# Patient Record
Sex: FEMALE | Race: OTHER | NOT HISPANIC OR LATINO
[De-identification: names, ages, dates, MRNs, and addresses within clinical notes are randomized per-mention and may not be internally consistent; named-entity substitution may affect disease eponyms.]

---

## 2019-08-05 PROBLEM — Z00.00 ENCOUNTER FOR PREVENTIVE HEALTH EXAMINATION: Status: ACTIVE | Noted: 2019-08-05

## 2019-08-23 ENCOUNTER — APPOINTMENT (OUTPATIENT)
Dept: NEUROLOGY | Facility: CLINIC | Age: 29
End: 2019-08-23
Payer: MEDICAID

## 2019-08-23 VITALS
HEART RATE: 76 BPM | SYSTOLIC BLOOD PRESSURE: 98 MMHG | WEIGHT: 137 LBS | HEIGHT: 64 IN | DIASTOLIC BLOOD PRESSURE: 60 MMHG | OXYGEN SATURATION: 99 % | BODY MASS INDEX: 23.39 KG/M2

## 2019-08-23 VITALS — DIASTOLIC BLOOD PRESSURE: 48 MMHG | SYSTOLIC BLOOD PRESSURE: 88 MMHG

## 2019-08-23 DIAGNOSIS — R51 HEADACHE: ICD-10-CM

## 2019-08-23 DIAGNOSIS — Z78.9 OTHER SPECIFIED HEALTH STATUS: ICD-10-CM

## 2019-08-23 DIAGNOSIS — R41.3 OTHER AMNESIA: ICD-10-CM

## 2019-08-23 PROCEDURE — 99203 OFFICE O/P NEW LOW 30 MIN: CPT

## 2019-08-29 ENCOUNTER — APPOINTMENT (OUTPATIENT)
Dept: NEUROLOGY | Facility: CLINIC | Age: 29
End: 2019-08-29
Payer: MEDICAID

## 2019-08-29 PROCEDURE — 95819 EEG AWAKE AND ASLEEP: CPT

## 2019-09-13 ENCOUNTER — APPOINTMENT (OUTPATIENT)
Dept: MRI IMAGING | Facility: HOSPITAL | Age: 29
End: 2019-09-13
Payer: MEDICAID

## 2019-09-13 ENCOUNTER — OUTPATIENT (OUTPATIENT)
Dept: OUTPATIENT SERVICES | Facility: HOSPITAL | Age: 29
LOS: 1 days | End: 2019-09-13
Payer: MEDICAID

## 2019-09-13 PROCEDURE — 70551 MRI BRAIN STEM W/O DYE: CPT | Mod: 26

## 2019-09-13 PROCEDURE — 70551 MRI BRAIN STEM W/O DYE: CPT

## 2019-10-16 ENCOUNTER — APPOINTMENT (OUTPATIENT)
Dept: NEUROLOGY | Facility: CLINIC | Age: 29
End: 2019-10-16
Payer: MEDICAID

## 2019-10-16 VITALS
HEIGHT: 64 IN | OXYGEN SATURATION: 98 % | HEART RATE: 75 BPM | WEIGHT: 137 LBS | DIASTOLIC BLOOD PRESSURE: 70 MMHG | BODY MASS INDEX: 23.39 KG/M2 | SYSTOLIC BLOOD PRESSURE: 102 MMHG

## 2019-10-16 DIAGNOSIS — R27.0 ATAXIA, UNSPECIFIED: ICD-10-CM

## 2019-10-16 DIAGNOSIS — M54.16 RADICULOPATHY, LUMBAR REGION: ICD-10-CM

## 2019-10-16 PROCEDURE — 99212 OFFICE O/P EST SF 10 MIN: CPT

## 2019-10-16 NOTE — PHYSICAL EXAM
[General Appearance - Alert] : alert [General Appearance - In No Acute Distress] : in no acute distress [Oriented To Time, Place, And Person] : oriented to person, place, and time [Impaired Insight] : insight and judgment were intact [Affect] : the affect was normal [Cranial Nerves Optic (II)] : visual acuity intact bilaterally,  visual fields full to confrontation, pupils equal round and reactive to light [Cranial Nerves Oculomotor (III)] : extraocular motion intact [Cranial Nerves Trigeminal (V)] : facial sensation intact symmetrically [Cranial Nerves Glossopharyngeal (IX)] : tongue and palate midline [Cranial Nerves Accessory (XI - Cranial And Spinal)] : head turning and shoulder shrug symmetric [Cranial Nerves Hypoglossal (XII)] : there was no tongue deviation with protrusion [Motor Strength Upper Extremities Left] : there was weakness of the left upper extremity [Dysesthesia] : dysesthesia was present [1+] : Ankle jerk left 1+ [FreeTextEntry6] : Left sciatic distribution  [FreeTextEntry7] : Left sciatic distribution  [Neck Appearance] : the appearance of the neck was normal [Neck Cervical Mass (___cm)] : no neck mass was observed [Jugular Venous Distention Increased] : there was no jugular-venous distention [Thyroid Diffuse Enlargement] : the thyroid was not enlarged [Thyroid Nodule] : there were no palpable thyroid nodules [Full Pulse] : the pedal pulses are present [Edema] : there was no peripheral edema [No Spinal Tenderness] : no spinal tenderness [FreeTextEntry1] : Abnormal gait - throws left leg with walking

## 2019-10-16 NOTE — CONSULT LETTER
[Dear  ___] : Dear  [unfilled], [Consult Letter:] : I had the pleasure of evaluating your patient, [unfilled]. [Please see my note below.] : Please see my note below. [Sincerely,] : Sincerely, [FreeTextEntry3] : Alfred Jackson MD\par  Professor of Neurology\par  Director of Neurology Outreach Programs \par  [facility]\par

## 2019-10-17 ENCOUNTER — APPOINTMENT (OUTPATIENT)
Dept: HEART AND VASCULAR | Facility: CLINIC | Age: 29
End: 2019-10-17
Payer: MEDICAID

## 2019-10-17 VITALS
WEIGHT: 137.99 LBS | TEMPERATURE: 98.4 F | HEART RATE: 85 BPM | OXYGEN SATURATION: 98 % | HEIGHT: 64 IN | SYSTOLIC BLOOD PRESSURE: 98 MMHG | BODY MASS INDEX: 23.56 KG/M2 | DIASTOLIC BLOOD PRESSURE: 60 MMHG

## 2019-10-17 DIAGNOSIS — R00.2 PALPITATIONS: ICD-10-CM

## 2019-10-17 PROCEDURE — 93000 ELECTROCARDIOGRAM COMPLETE: CPT

## 2019-10-17 PROCEDURE — 93306 TTE W/DOPPLER COMPLETE: CPT

## 2019-10-17 PROCEDURE — 99202 OFFICE O/P NEW SF 15 MIN: CPT

## 2019-10-18 NOTE — DISCUSSION/SUMMARY
[FreeTextEntry1] : The number of diagnostic and/or management options include:\par CAD, HTN, HPL, CV Prevention\par \par palp - \par Evaluated adrenergic innervation of the circulation and of the heart in autonomic failure. The following tests were performed: [id number] blood pressure and R-R interval response to Valsalva maneuver, sustained hand , and blood pressure and heart rate responses to tilt-up or active standing. - normal\par \par 14 d event mointopr to eval for arryuthmia\par 2d echo to eval for structural heart\par \par \par Labs, radiology: ekg\par \par Aspirin therapy: no\par \par LDL: n/a\par \par High Complexity Medical Decision Making\par

## 2019-10-18 NOTE — PHYSICAL EXAM
[General Appearance - Well Developed] : well developed [Normal Appearance] : normal appearance [General Appearance - Well Nourished] : well nourished [Well Groomed] : well groomed [No Deformities] : no deformities [General Appearance - In No Acute Distress] : no acute distress [Normal Conjunctiva] : the conjunctiva exhibited no abnormalities [Eyelids - No Xanthelasma] : the eyelids demonstrated no xanthelasmas [Normal Oral Mucosa] : normal oral mucosa [No Oral Pallor] : no oral pallor [No Oral Cyanosis] : no oral cyanosis [Normal Jugular Venous A Waves Present] : normal jugular venous A waves present [No Jugular Venous [place]field A Waves] : no jugular venous whitfield A waves [Normal Jugular Venous V Waves Present] : normal jugular venous V waves present [Heart Rate And Rhythm] : heart rate and rhythm were normal [Murmurs] : no murmurs present [Heart Sounds] : normal S1 and S2 [Auscultation Breath Sounds / Voice Sounds] : lungs were clear to auscultation bilaterally [Respiration, Rhythm And Depth] : normal respiratory rhythm and effort [Exaggerated Use Of Accessory Muscles For Inspiration] : no accessory muscle use [Abdomen Soft] : soft [Abdomen Mass (___ Cm)] : no abdominal mass palpated [Abdomen Tenderness] : non-tender [Abnormal Walk] : normal gait [Gait - Sufficient For Exercise Testing] : the gait was sufficient for exercise testing [Nail Clubbing] : no clubbing of the fingernails [Cyanosis, Localized] : no localized cyanosis [Petechial Hemorrhages (___cm)] : no petechial hemorrhages [] : no rash [Skin Color & Pigmentation] : normal skin color and pigmentation [No Venous Stasis] : no venous stasis [Skin Lesions] : no skin lesions [No Xanthoma] : no  xanthoma was observed [No Skin Ulcers] : no skin ulcer [Affect] : the affect was normal [Oriented To Time, Place, And Person] : oriented to person, place, and time [Mood] : the mood was normal [No Anxiety] : not feeling anxious

## 2019-10-18 NOTE — HISTORY OF PRESENT ILLNESS
[FreeTextEntry1] : \par 29 F no cardiac risk factros sent by neurologist for r/o embolic etiology of lesions seen on mri brain.  s/o palpitaitons no vinay syncope. no cp sob cabezas \par \par ekg nsr no st changes normal segments and intervals

## 2019-10-24 ENCOUNTER — APPOINTMENT (OUTPATIENT)
Dept: NEUROLOGY | Facility: CLINIC | Age: 29
End: 2019-10-24
Payer: MEDICAID

## 2019-10-25 PROCEDURE — 95953: CPT

## 2019-10-26 ENCOUNTER — APPOINTMENT (OUTPATIENT)
Dept: NEUROLOGY | Facility: CLINIC | Age: 29
End: 2019-10-26

## 2019-10-26 PROCEDURE — 95953: CPT

## 2020-09-30 ENCOUNTER — APPOINTMENT (OUTPATIENT)
Dept: NEUROLOGY | Facility: CLINIC | Age: 30
End: 2020-09-30
Payer: MEDICAID

## 2020-09-30 VITALS
HEIGHT: 64 IN | HEART RATE: 78 BPM | DIASTOLIC BLOOD PRESSURE: 56 MMHG | OXYGEN SATURATION: 98 % | SYSTOLIC BLOOD PRESSURE: 92 MMHG | TEMPERATURE: 98.6 F | WEIGHT: 160 LBS | BODY MASS INDEX: 27.31 KG/M2

## 2020-09-30 DIAGNOSIS — Z34.93 ENCOUNTER FOR SUPERVISION OF NORMAL PREGNANCY, UNSPECIFIED, THIRD TRIMESTER: ICD-10-CM

## 2020-09-30 DIAGNOSIS — R90.89 OTHER ABNORMAL FINDINGS ON DIAGNOSTIC IMAGING OF CENTRAL NERVOUS SYSTEM: ICD-10-CM

## 2020-09-30 PROCEDURE — 99215 OFFICE O/P EST HI 40 MIN: CPT

## 2020-09-30 NOTE — DISCUSSION/SUMMARY
[FreeTextEntry1] : 30 year old woman pregnant women with resolved headaches but still complaints of memory loss.\par Here to review MR brain to ensure safe delivery. MRI shows minor signal changes which are not pathological in nature. \par Referred by CHAY Palacios.\par

## 2020-09-30 NOTE — PHYSICAL EXAM
[FreeTextEntry1] : General:\par Constitutional:  Sitting comfortably in NAD.\par Psychiatric: well-groomed, appropriate affect, insight/judgment intact\par Ears, Nose, Throat: no abnormalities, mucus membranes moist\par Neck: supple\par Cardiovascular: regular rate and rhythm, normal S1/S2, no murmurs \par Chest: Clear to bases. 	Abdomen: soft, non-tender\par Extremities: no edema, clubbing or cyanosis\par Skin:  no rash or neurocutaneous signs \par \par Cognitive:\par Orientation, language, memory and knowledge screens intact.\par Cranial Nerves:\par II: Full to confrontation; disc margins sharp. III/IV/VI: PERRL EOMF No nystagmus\par [id number]: Symmetric, VII: Face appears symmetric VIII: Normal to screening, IX/X: Palate Elevates Symmetrical  XI: Trapezius Symmetric  XII: Tongue midline\par Motor:\par Power: 5/5 throughout, tone: normal x 4 limbs, no tremor \par Sensation:\par Intact to light touch. \par Coordination/Gait:\par Finger-nose-finger intact, normal rapid-alternating movements.\par Fine motor normal with normal rapid finger taps and heel-toe tapping \par Narrow based gait, tandem forward and back ok\par Reflexes:\par DTR: 2+ symmetric x 4 limbs

## 2020-09-30 NOTE — HISTORY OF PRESENT ILLNESS
[FreeTextEntry1] : 30 year old, right handed, female who presents today for evaluation of MRI with white spots. She was a patient of Dr. Jackson's. Last saw Dr. Jackson October 2019. Is now currently 38 weeks pregnant. \par \par She saw Dr. Jackson about a year ago for headaches and memory loss. The headaches went away on there own so she never follow up with him again. Her OBGYN, Dr. Alysha Palacios wanted her to come for interpretation of the MRI to make sure nothing was wrong to ensure a safe delivery and if there are any specific tips. \par \par Still complains of memory loss. Examples she gives are forgetting things like two to three weeks later a coworker will come up to her and she will forget it. She also complains of memory loss from the past like cant remember things that happened years ago. She cannot remember things from 2-3 years ago like going to school or her friends. Her memories are more from childhood and when she came to [place]. \par \par [facility] [place] This will be her fourth kid. \par \par Denies any medical history or family history. \par

## 2020-09-30 NOTE — ASSESSMENT
[FreeTextEntry1] : No interventions needed at this time. \par No neurological contraindications to safe delivery. \par Due date is in two weeks. \par No need for follow up unless headaches return, worsening of memory, or for any other signs or symptoms.